# Patient Record
Sex: FEMALE | Race: WHITE | NOT HISPANIC OR LATINO | Employment: FULL TIME | URBAN - NONMETROPOLITAN AREA
[De-identification: names, ages, dates, MRNs, and addresses within clinical notes are randomized per-mention and may not be internally consistent; named-entity substitution may affect disease eponyms.]

---

## 2023-07-17 PROBLEM — Z98.890 S/P SPINAL SURGERY: Status: RESOLVED | Noted: 2023-07-17 | Resolved: 2023-07-17

## 2023-07-17 PROBLEM — M19.071 ARTHRITIS OF FOOT, RIGHT: Status: RESOLVED | Noted: 2023-07-17 | Resolved: 2023-07-17

## 2023-07-17 PROBLEM — F32.A ANXIETY AND DEPRESSION: Status: ACTIVE | Noted: 2023-07-17

## 2023-07-17 PROBLEM — I10 BENIGN HYPERTENSION: Status: ACTIVE | Noted: 2023-07-17

## 2023-07-17 PROBLEM — K63.5 COLON POLYP: Status: RESOLVED | Noted: 2023-07-17 | Resolved: 2023-07-17

## 2023-07-17 PROBLEM — M76.71 PERONEAL TENDINITIS OF RIGHT LOWER EXTREMITY: Status: RESOLVED | Noted: 2023-07-17 | Resolved: 2023-07-17

## 2023-07-17 PROBLEM — M43.10 ACQUIRED SPONDYLOLISTHESIS: Status: RESOLVED | Noted: 2023-07-17 | Resolved: 2023-07-17

## 2023-07-17 PROBLEM — F32.1 MODERATE MAJOR DEPRESSION (MULTI): Status: ACTIVE | Noted: 2023-07-17

## 2023-07-17 PROBLEM — M79.18 MYOFASCIAL PAIN: Status: ACTIVE | Noted: 2023-07-17

## 2023-07-17 PROBLEM — F41.9 ANXIETY AND DEPRESSION: Status: ACTIVE | Noted: 2023-07-17

## 2023-07-17 PROBLEM — M54.16 LUMBAR RADICULOPATHY: Status: ACTIVE | Noted: 2023-07-17

## 2023-07-17 PROBLEM — J30.9 ALLERGIC RHINITIS: Status: ACTIVE | Noted: 2023-07-17

## 2023-07-17 PROBLEM — K29.00 ACUTE GASTRITIS: Status: RESOLVED | Noted: 2023-07-17 | Resolved: 2023-07-17

## 2023-07-17 PROBLEM — R42 VERTIGO: Status: ACTIVE | Noted: 2023-07-17

## 2023-07-17 PROBLEM — M48.07 LUMBOSACRAL SPINAL STENOSIS: Status: ACTIVE | Noted: 2023-07-17

## 2023-07-17 PROBLEM — K58.0 IRRITABLE BOWEL SYNDROME WITH DIARRHEA: Status: RESOLVED | Noted: 2023-07-17 | Resolved: 2023-07-17

## 2023-07-17 PROBLEM — M85.80 OSTEOPENIA: Status: ACTIVE | Noted: 2023-07-17

## 2023-07-17 RX ORDER — FLUTICASONE PROPIONATE 50 MCG
SPRAY, SUSPENSION (ML) NASAL
COMMUNITY
Start: 2019-06-04

## 2023-07-17 RX ORDER — LIDOCAINE 50 MG/G
PATCH TOPICAL
COMMUNITY

## 2023-07-17 RX ORDER — VIT C/E/ZN/COPPR/LUTEIN/ZEAXAN 250MG-90MG
1 CAPSULE ORAL DAILY
COMMUNITY

## 2023-07-17 RX ORDER — LORATADINE 10 MG/1
TABLET ORAL
COMMUNITY
Start: 2019-06-04

## 2023-07-17 RX ORDER — ESTRADIOL 2 MG/1
1 TABLET ORAL DAILY
COMMUNITY

## 2023-07-18 ENCOUNTER — OFFICE VISIT (OUTPATIENT)
Dept: PRIMARY CARE | Facility: CLINIC | Age: 63
End: 2023-07-18
Payer: MEDICARE

## 2023-07-18 VITALS
OXYGEN SATURATION: 99 % | HEART RATE: 94 BPM | DIASTOLIC BLOOD PRESSURE: 88 MMHG | TEMPERATURE: 96 F | HEIGHT: 63 IN | BODY MASS INDEX: 24.38 KG/M2 | WEIGHT: 137.6 LBS | SYSTOLIC BLOOD PRESSURE: 126 MMHG

## 2023-07-18 DIAGNOSIS — F17.210 CIGARETTE NICOTINE DEPENDENCE, UNCOMPLICATED: ICD-10-CM

## 2023-07-18 DIAGNOSIS — F32.1 MODERATE MAJOR DEPRESSION (MULTI): ICD-10-CM

## 2023-07-18 DIAGNOSIS — I10 BENIGN HYPERTENSION: Primary | ICD-10-CM

## 2023-07-18 DIAGNOSIS — Z78.0 ASYMPTOMATIC POSTMENOPAUSAL STATE: ICD-10-CM

## 2023-07-18 DIAGNOSIS — E89.40 ASYMPTOMATIC POSTPROCEDURAL OVARIAN FAILURE: ICD-10-CM

## 2023-07-18 PROCEDURE — 1160F RVW MEDS BY RX/DR IN RCRD: CPT | Performed by: FAMILY MEDICINE

## 2023-07-18 PROCEDURE — 1036F TOBACCO NON-USER: CPT | Performed by: FAMILY MEDICINE

## 2023-07-18 PROCEDURE — 1159F MED LIST DOCD IN RCRD: CPT | Performed by: FAMILY MEDICINE

## 2023-07-18 PROCEDURE — 99213 OFFICE O/P EST LOW 20 MIN: CPT | Performed by: FAMILY MEDICINE

## 2023-07-18 PROCEDURE — 3079F DIAST BP 80-89 MM HG: CPT | Performed by: FAMILY MEDICINE

## 2023-07-18 PROCEDURE — 3008F BODY MASS INDEX DOCD: CPT | Performed by: FAMILY MEDICINE

## 2023-07-18 PROCEDURE — 3074F SYST BP LT 130 MM HG: CPT | Performed by: FAMILY MEDICINE

## 2023-07-18 ASSESSMENT — PATIENT HEALTH QUESTIONNAIRE - PHQ9
SUM OF ALL RESPONSES TO PHQ9 QUESTIONS 1 AND 2: 0
1. LITTLE INTEREST OR PLEASURE IN DOING THINGS: NOT AT ALL
2. FEELING DOWN, DEPRESSED OR HOPELESS: NOT AT ALL

## 2023-07-18 ASSESSMENT — ENCOUNTER SYMPTOMS
OCCASIONAL FEELINGS OF UNSTEADINESS: 0
DEPRESSION: 0
LOSS OF SENSATION IN FEET: 0

## 2023-07-18 NOTE — PROGRESS NOTES
"Subjective   Patient ID: Trisha Benitez is a 63 y.o. female who presents for Follow-up (No concerns having CT with contrast in a couple weeks).  HPI  Here for follow up  Has 20 pack year history of smoking due for her LDCT for follow up  HTN under control   Depression under control, has been stressed, mother was mauled by a pitbull and has lost the use of her right hand    Review of Systems  General: no fever  Eyes: no blurry vision  ENT: no sore throat, no ear pain  Resp: no cough, sob or wheezing  Cardio: no chest pain, no palpitations  Abd: no nausea/vomiting  : no dysuria, no increased urinary frequency      /88   Pulse 94   Temp 35.6 °C (96 °F)   Ht 1.588 m (5' 2.5\")   Wt 62.4 kg (137 lb 9.6 oz)   SpO2 99%   BMI 24.77 kg/m²       Objective   Physical Exam  Gen: NAD, alert  Head: normocephalic/atraumatic  Eyes: conjunctivae normal  Ears: canals clear bilaterally, TM normal   Nose: external nose normal   Resp: Clear to auscultation  CVS: Regular rate and rhythm  Abdomen: soft, NT, ND  Ext: no edema, NT of lower extremities  Neuro: gait normal     Assessment/Plan   Problem List Items Addressed This Visit       Benign hypertension - Primary    Moderate major depression (CMS/HCC)     Other Visit Diagnoses       Asymptomatic postmenopausal state        Relevant Orders    XR DEXA bone density    Cigarette nicotine dependence, uncomplicated        Relevant Orders    CT lung screening low dose    BMI 24.0-24.9, adult                   "

## 2023-07-18 NOTE — PATIENT INSTRUCTIONS
Please follow up in 6 months  Please take your medications as prescribed  Please get your CT chest and dexa scan done

## 2023-07-20 DIAGNOSIS — M25.552 LEFT HIP PAIN: Primary | ICD-10-CM

## 2023-07-28 DIAGNOSIS — E04.1 THYROID NODULE: Primary | ICD-10-CM

## 2024-01-18 ENCOUNTER — OFFICE VISIT (OUTPATIENT)
Dept: PRIMARY CARE | Facility: CLINIC | Age: 64
End: 2024-01-18
Payer: MEDICARE

## 2024-01-18 VITALS
WEIGHT: 139 LBS | SYSTOLIC BLOOD PRESSURE: 150 MMHG | DIASTOLIC BLOOD PRESSURE: 80 MMHG | TEMPERATURE: 97 F | OXYGEN SATURATION: 98 % | BODY MASS INDEX: 25.58 KG/M2 | HEIGHT: 62 IN | HEART RATE: 85 BPM

## 2024-01-18 DIAGNOSIS — Z12.11 SCREEN FOR COLON CANCER: ICD-10-CM

## 2024-01-18 DIAGNOSIS — F32.1 MODERATE MAJOR DEPRESSION (MULTI): ICD-10-CM

## 2024-01-18 DIAGNOSIS — Z00.00 MEDICARE ANNUAL WELLNESS VISIT, SUBSEQUENT: Primary | ICD-10-CM

## 2024-01-18 DIAGNOSIS — Z80.0 FAMILY HISTORY OF COLON CANCER: ICD-10-CM

## 2024-01-18 DIAGNOSIS — E78.5 HYPERLIPIDEMIA, UNSPECIFIED HYPERLIPIDEMIA TYPE: ICD-10-CM

## 2024-01-18 DIAGNOSIS — I10 BENIGN HYPERTENSION: ICD-10-CM

## 2024-01-18 DIAGNOSIS — T14.8XXA MUSCLE STRAIN: ICD-10-CM

## 2024-01-18 PROCEDURE — 1159F MED LIST DOCD IN RCRD: CPT | Performed by: FAMILY MEDICINE

## 2024-01-18 PROCEDURE — 1170F FXNL STATUS ASSESSED: CPT | Performed by: FAMILY MEDICINE

## 2024-01-18 PROCEDURE — 1160F RVW MEDS BY RX/DR IN RCRD: CPT | Performed by: FAMILY MEDICINE

## 2024-01-18 PROCEDURE — 3008F BODY MASS INDEX DOCD: CPT | Performed by: FAMILY MEDICINE

## 2024-01-18 PROCEDURE — 3079F DIAST BP 80-89 MM HG: CPT | Performed by: FAMILY MEDICINE

## 2024-01-18 PROCEDURE — G0439 PPPS, SUBSEQ VISIT: HCPCS | Performed by: FAMILY MEDICINE

## 2024-01-18 PROCEDURE — 1036F TOBACCO NON-USER: CPT | Performed by: FAMILY MEDICINE

## 2024-01-18 PROCEDURE — 3077F SYST BP >= 140 MM HG: CPT | Performed by: FAMILY MEDICINE

## 2024-01-18 RX ORDER — PREDNISONE 20 MG/1
TABLET ORAL
Qty: 18 TABLET | Refills: 0 | Status: SHIPPED | OUTPATIENT
Start: 2024-01-18

## 2024-01-18 ASSESSMENT — PATIENT HEALTH QUESTIONNAIRE - PHQ9
2. FEELING DOWN, DEPRESSED OR HOPELESS: NOT AT ALL
SUM OF ALL RESPONSES TO PHQ9 QUESTIONS 1 AND 2: 0
1. LITTLE INTEREST OR PLEASURE IN DOING THINGS: NOT AT ALL

## 2024-01-18 ASSESSMENT — ENCOUNTER SYMPTOMS
DEPRESSION: 0
OCCASIONAL FEELINGS OF UNSTEADINESS: 0
LOSS OF SENSATION IN FEET: 0

## 2024-01-18 ASSESSMENT — ACTIVITIES OF DAILY LIVING (ADL)
MANAGING_FINANCES: INDEPENDENT
BATHING: INDEPENDENT
TAKING_MEDICATION: INDEPENDENT
DOING_HOUSEWORK: INDEPENDENT
GROCERY_SHOPPING: INDEPENDENT
DRESSING: INDEPENDENT

## 2024-01-18 NOTE — PROGRESS NOTES
"Subjective   Reason for Visit: Trisha Benitez is an 63 y.o. female here for a Medicare Wellness visit.     Past Medical, Surgical, and Family History reviewed and updated in chart.    Reviewed all medications by prescribing practitioner or clinical pharmacist (such as prescriptions, OTCs, herbal therapies and supplements) and documented in the medical record.    HPI  Here for medicare annual physical  Due for her repeat colonoscopy, has FH of colon cancer  BP elevated today, usually better, will come back next week for bp check  Right forearm has been hurting for the past week and a half, denies any swelling, no trauma. No numbness/tingling. Has not put anything on it. Did clean the attic recently.   Depression under control     Patient Care Team:  Rosalina Begum MD as PCP - General  Rosalina Begum MD as PCP - [employer] PCP     Review of Systems  General: no fever  Eyes: no blurry vision  ENT: no sore throat, no ear pain  Resp: no cough, sob or wheezing  Cardio: no chest pain, no palpitations  Abd: no nausea/vomiting  : no dysuria, no increased urinary frequency    Objective   Vitals:  /80   Pulse 85   Temp 36.1 °C (97 °F)   Ht 1.575 m (5' 2\")   Wt 63 kg (139 lb)   SpO2 98%   BMI 25.42 kg/m²       Physical Exam  Gen: NAD, alert  Head: normocephalic/atraumatic  Eyes: conjunctivae normal  Ears: canals clear bilaterally, TM normal   Nose: external nose normal   Oropharynx: clear   Resp: Clear to auscultation  CVS: Regular rate and rhythm  Abdomen: soft, NT, ND  Ext: no edema, NT of lower extremities, no swelling, redness or lesions on right forearm  Neuro: gait normal     Assessment/Plan   Problem List Items Addressed This Visit       Benign hypertension  Continue current regimen     Moderate major depression (CMS/HCC)  stable     Other Visit Diagnoses       Medicare annual wellness visit, subsequent    -  Primary    BMI 25.0-25.9,adult        Muscle strain        " Relevant Medications    predniSONE (Deltasone) 20 mg tablet  Advised to try biofreeze to the area as well    Screen for colon cancer        Relevant Orders    Colonoscopy Screening; High Risk Patient    Family history of colon cancer        Relevant Orders    Colonoscopy Screening; High Risk Patient    Hyperlipidemia, unspecified hyperlipidemia type        Relevant Orders    Lipid Panel    TSH with reflex to Free T4 if abnormal    CBC    Comprehensive Metabolic Panel

## 2024-02-05 ENCOUNTER — LAB (OUTPATIENT)
Dept: LAB | Facility: LAB | Age: 64
End: 2024-02-05
Payer: MEDICARE

## 2024-02-05 ENCOUNTER — TELEPHONE (OUTPATIENT)
Dept: PRIMARY CARE | Facility: CLINIC | Age: 64
End: 2024-02-05

## 2024-02-05 DIAGNOSIS — I10 BENIGN HYPERTENSION: Primary | ICD-10-CM

## 2024-02-05 DIAGNOSIS — E78.5 HYPERLIPIDEMIA, UNSPECIFIED HYPERLIPIDEMIA TYPE: ICD-10-CM

## 2024-02-05 LAB
ALBUMIN SERPL BCP-MCNC: 4.2 G/DL (ref 3.4–5)
ALP SERPL-CCNC: 61 U/L (ref 33–136)
ALT SERPL W P-5'-P-CCNC: 16 U/L (ref 7–45)
ANION GAP SERPL CALC-SCNC: 11 MMOL/L (ref 10–20)
AST SERPL W P-5'-P-CCNC: 16 U/L (ref 9–39)
BILIRUB SERPL-MCNC: 0.5 MG/DL (ref 0–1.2)
BUN SERPL-MCNC: 19 MG/DL (ref 6–23)
CALCIUM SERPL-MCNC: 9.3 MG/DL (ref 8.6–10.3)
CHLORIDE SERPL-SCNC: 103 MMOL/L (ref 98–107)
CHOLEST SERPL-MCNC: 202 MG/DL (ref 0–199)
CHOLESTEROL/HDL RATIO: 1.8
CO2 SERPL-SCNC: 30 MMOL/L (ref 21–32)
CREAT SERPL-MCNC: 1.06 MG/DL (ref 0.5–1.05)
EGFRCR SERPLBLD CKD-EPI 2021: 58 ML/MIN/1.73M*2
ERYTHROCYTE [DISTWIDTH] IN BLOOD BY AUTOMATED COUNT: 12.3 % (ref 11.5–14.5)
GLUCOSE SERPL-MCNC: 89 MG/DL (ref 74–99)
HCT VFR BLD AUTO: 42.2 % (ref 36–46)
HDLC SERPL-MCNC: 112.3 MG/DL
HGB BLD-MCNC: 13.7 G/DL (ref 12–16)
LDLC SERPL CALC-MCNC: 81 MG/DL
MCH RBC QN AUTO: 31.1 PG (ref 26–34)
MCHC RBC AUTO-ENTMCNC: 32.5 G/DL (ref 32–36)
MCV RBC AUTO: 96 FL (ref 80–100)
NON HDL CHOLESTEROL: 90 MG/DL (ref 0–149)
NRBC BLD-RTO: 0 /100 WBCS (ref 0–0)
PLATELET # BLD AUTO: 268 X10*3/UL (ref 150–450)
POTASSIUM SERPL-SCNC: 3.8 MMOL/L (ref 3.5–5.3)
PROT SERPL-MCNC: 7.1 G/DL (ref 6.4–8.2)
RBC # BLD AUTO: 4.4 X10*6/UL (ref 4–5.2)
SODIUM SERPL-SCNC: 140 MMOL/L (ref 136–145)
TRIGL SERPL-MCNC: 45 MG/DL (ref 0–149)
TSH SERPL-ACNC: 1.58 MIU/L (ref 0.44–3.98)
VLDL: 9 MG/DL (ref 0–40)
WBC # BLD AUTO: 4.9 X10*3/UL (ref 4.4–11.3)

## 2024-02-05 PROCEDURE — 36415 COLL VENOUS BLD VENIPUNCTURE: CPT

## 2024-02-05 NOTE — TELEPHONE ENCOUNTER
Will you put in a referral for Dr. Oliveros for a wellness eye exam, I can print it and the patient will  at her bp check tomorrow.

## 2024-02-06 ENCOUNTER — CLINICAL SUPPORT (OUTPATIENT)
Dept: PRIMARY CARE | Facility: CLINIC | Age: 64
End: 2024-02-06
Payer: MEDICARE

## 2024-02-06 VITALS — DIASTOLIC BLOOD PRESSURE: 84 MMHG | SYSTOLIC BLOOD PRESSURE: 132 MMHG

## 2024-04-02 PROCEDURE — 88175 CYTOPATH C/V AUTO FLUID REDO: CPT

## 2024-04-02 PROCEDURE — 87624 HPV HI-RISK TYP POOLED RSLT: CPT

## 2024-04-02 PROCEDURE — 88141 CYTOPATH C/V INTERPRET: CPT | Performed by: STUDENT IN AN ORGANIZED HEALTH CARE EDUCATION/TRAINING PROGRAM

## 2024-04-04 ENCOUNTER — LAB REQUISITION (OUTPATIENT)
Dept: LAB | Facility: HOSPITAL | Age: 64
End: 2024-04-04
Payer: MEDICARE

## 2024-04-04 DIAGNOSIS — Z11.51 ENCOUNTER FOR SCREENING FOR HUMAN PAPILLOMAVIRUS (HPV): ICD-10-CM

## 2024-04-04 DIAGNOSIS — Z01.419 ENCOUNTER FOR GYNECOLOGICAL EXAMINATION (GENERAL) (ROUTINE) WITHOUT ABNORMAL FINDINGS: ICD-10-CM

## 2024-07-16 ENCOUNTER — APPOINTMENT (OUTPATIENT)
Dept: PRIMARY CARE | Facility: CLINIC | Age: 64
End: 2024-07-16
Payer: MEDICARE

## 2024-07-16 VITALS
HEART RATE: 89 BPM | DIASTOLIC BLOOD PRESSURE: 85 MMHG | SYSTOLIC BLOOD PRESSURE: 129 MMHG | TEMPERATURE: 97 F | OXYGEN SATURATION: 96 % | BODY MASS INDEX: 24.88 KG/M2 | HEIGHT: 62 IN | WEIGHT: 135.2 LBS

## 2024-07-16 DIAGNOSIS — N18.31 STAGE 3A CHRONIC KIDNEY DISEASE (MULTI): ICD-10-CM

## 2024-07-16 DIAGNOSIS — J30.9 ALLERGIC RHINITIS, UNSPECIFIED SEASONALITY, UNSPECIFIED TRIGGER: ICD-10-CM

## 2024-07-16 DIAGNOSIS — R10.9 RIGHT SIDED ABDOMINAL PAIN: ICD-10-CM

## 2024-07-16 DIAGNOSIS — F17.210 CIGARETTE NICOTINE DEPENDENCE, UNCOMPLICATED: ICD-10-CM

## 2024-07-16 DIAGNOSIS — E04.2 MULTIPLE THYROID NODULES: Primary | ICD-10-CM

## 2024-07-16 DIAGNOSIS — Z12.31 ENCOUNTER FOR SCREENING MAMMOGRAM FOR MALIGNANT NEOPLASM OF BREAST: ICD-10-CM

## 2024-07-16 PROBLEM — N18.30 STAGE 3 CHRONIC KIDNEY DISEASE, UNSPECIFIED WHETHER STAGE 3A OR 3B CKD (MULTI): Status: ACTIVE | Noted: 2024-07-16

## 2024-07-16 LAB
POC APPEARANCE, URINE: CLEAR
POC BILIRUBIN, URINE: NEGATIVE
POC BLOOD, URINE: NEGATIVE
POC COLOR, URINE: YELLOW
POC GLUCOSE, URINE: NEGATIVE MG/DL
POC KETONES, URINE: NEGATIVE MG/DL
POC LEUKOCYTES, URINE: NEGATIVE
POC NITRITE,URINE: NEGATIVE
POC PH, URINE: 7 PH
POC PROTEIN, URINE: NEGATIVE MG/DL
POC SPECIFIC GRAVITY, URINE: 1.02
POC UROBILINOGEN, URINE: 1 EU/DL

## 2024-07-16 PROCEDURE — 3079F DIAST BP 80-89 MM HG: CPT | Performed by: FAMILY MEDICINE

## 2024-07-16 PROCEDURE — 3008F BODY MASS INDEX DOCD: CPT | Performed by: FAMILY MEDICINE

## 2024-07-16 PROCEDURE — 1123F ACP DISCUSS/DSCN MKR DOCD: CPT | Performed by: FAMILY MEDICINE

## 2024-07-16 PROCEDURE — 81003 URINALYSIS AUTO W/O SCOPE: CPT | Performed by: FAMILY MEDICINE

## 2024-07-16 PROCEDURE — 1159F MED LIST DOCD IN RCRD: CPT | Performed by: FAMILY MEDICINE

## 2024-07-16 PROCEDURE — 99214 OFFICE O/P EST MOD 30 MIN: CPT | Performed by: FAMILY MEDICINE

## 2024-07-16 PROCEDURE — 3074F SYST BP LT 130 MM HG: CPT | Performed by: FAMILY MEDICINE

## 2024-07-16 PROCEDURE — 1157F ADVNC CARE PLAN IN RCRD: CPT | Performed by: FAMILY MEDICINE

## 2024-07-16 PROCEDURE — 1158F ADVNC CARE PLAN TLK DOCD: CPT | Performed by: FAMILY MEDICINE

## 2024-07-16 RX ORDER — LORATADINE 10 MG/1
10 TABLET ORAL DAILY
Qty: 90 TABLET | Refills: 3 | Status: SHIPPED | OUTPATIENT
Start: 2024-07-16

## 2024-07-16 RX ORDER — LORATADINE 10 MG/1
TABLET ORAL
Status: CANCELLED | OUTPATIENT
Start: 2024-07-16

## 2024-07-16 ASSESSMENT — ENCOUNTER SYMPTOMS
LOSS OF SENSATION IN FEET: 0
OCCASIONAL FEELINGS OF UNSTEADINESS: 0
DEPRESSION: 0

## 2024-07-16 NOTE — PROGRESS NOTES
"Subjective   Patient ID: Trisha Benitez is a 64 y.o. female who presents for Follow-up (Feels like she pulled a muscle on right side on stomach going on for a couple weeks).    HPI  Here for follow up  Has not yet scheduled colonoscopy, has FH of colon cancer  Has been having right flank pain x couple of weeks. Does wear a back brace not sure if that has been putting pressure on her stomach. Pain with movement. No fever. No nausea/vomiting. No bloating. No dysuria or increased urinary frequency. No constipation or diarrhea, but has been having increased gas. No trauma.  Due for her annual follow up of thyroid nodules next month  Needs refill of claritin  Has CKD, stable  Due for her annual LDCT for 20 pack year smoking history    Review of Systems  General: no fever  Eyes: no blurry vision  ENT: no sore throat, no ear pain  Resp: no cough, sob or wheezing  Cardio: no chest pain, no palpitations  Abd: see HPI  : no dysuria, no increased urinary frequency      /85   Pulse 89   Temp 36.1 °C (97 °F)   Ht 1.575 m (5' 2\")   Wt 61.3 kg (135 lb 3.2 oz)   SpO2 96%   BMI 24.73 kg/m²       Objective   Physical Exam  Gen: NAD, alert  Head: normocephalic/atraumatic  Eyes: conjunctivae normal  Ears: canals clear bilaterally, TM normal   Nose: external nose normal   Oropharynx: clear   Resp: Clear to auscultation  CVS: Regular rate and rhythm  Abdomen: soft, tenderness in the right upper quadrant, no rebound or guarding  Ext: no edema, NT of lower extremities  Neuro: gait normal     Assessment/Plan   Problem List Items Addressed This Visit       Allergic rhinitis    Relevant Medications    loratadine (Claritin) 10 mg tablet    Stage 3 chronic kidney disease, unspecified whether stage 3a or 3b CKD (Multi)  stable     Other Visit Diagnoses       Multiple thyroid nodules    -  Primary    Relevant Orders    US thyroid    Cigarette smoker        Relevant Orders    CT lung screening low dose    Encounter for screening " mammogram for malignant neoplasm of breast        Relevant Orders    BI mammo bilateral screening tomosynthesis    Right sided abdominal pain        Relevant Orders    CT abdomen pelvis wo IV contrast    POCT UA Automated manually resulted (Completed)

## 2024-08-29 ENCOUNTER — HOSPITAL ENCOUNTER (OUTPATIENT)
Dept: RADIOLOGY | Facility: HOSPITAL | Age: 64
Discharge: HOME | End: 2024-08-29
Payer: MEDICARE

## 2024-08-29 DIAGNOSIS — R10.9 RIGHT SIDED ABDOMINAL PAIN: ICD-10-CM

## 2024-08-29 DIAGNOSIS — F17.210 CIGARETTE NICOTINE DEPENDENCE, UNCOMPLICATED: ICD-10-CM

## 2024-08-29 PROCEDURE — 71271 CT THORAX LUNG CANCER SCR C-: CPT

## 2024-08-29 PROCEDURE — 74176 CT ABD & PELVIS W/O CONTRAST: CPT

## 2024-08-30 ENCOUNTER — HOSPITAL ENCOUNTER (OUTPATIENT)
Dept: RADIOLOGY | Facility: HOSPITAL | Age: 64
Discharge: HOME | End: 2024-08-30
Payer: MEDICARE

## 2024-08-30 ENCOUNTER — APPOINTMENT (OUTPATIENT)
Dept: RADIOLOGY | Facility: HOSPITAL | Age: 64
End: 2024-08-30
Payer: MEDICARE

## 2024-08-30 DIAGNOSIS — E04.2 MULTIPLE THYROID NODULES: ICD-10-CM

## 2024-08-30 PROCEDURE — 76536 US EXAM OF HEAD AND NECK: CPT

## 2024-09-04 ENCOUNTER — APPOINTMENT (OUTPATIENT)
Dept: RADIOLOGY | Facility: HOSPITAL | Age: 64
End: 2024-09-04
Payer: MEDICARE

## 2024-09-04 VITALS — WEIGHT: 134 LBS | HEIGHT: 61 IN | BODY MASS INDEX: 25.3 KG/M2

## 2024-09-04 DIAGNOSIS — Z12.31 ENCOUNTER FOR SCREENING MAMMOGRAM FOR MALIGNANT NEOPLASM OF BREAST: ICD-10-CM

## 2024-09-04 PROCEDURE — 77067 SCR MAMMO BI INCL CAD: CPT

## 2024-09-04 PROCEDURE — 77067 SCR MAMMO BI INCL CAD: CPT | Performed by: RADIOLOGY

## 2024-09-04 PROCEDURE — 77063 BREAST TOMOSYNTHESIS BI: CPT | Performed by: RADIOLOGY

## 2025-01-16 ENCOUNTER — APPOINTMENT (OUTPATIENT)
Dept: PRIMARY CARE | Facility: CLINIC | Age: 65
End: 2025-01-16
Payer: MEDICARE

## 2025-04-16 PROCEDURE — 88175 CYTOPATH C/V AUTO FLUID REDO: CPT

## 2025-04-18 ENCOUNTER — LAB REQUISITION (OUTPATIENT)
Dept: LAB | Facility: HOSPITAL | Age: 65
End: 2025-04-18
Payer: MEDICARE

## 2025-04-18 DIAGNOSIS — Z11.51 ENCOUNTER FOR SCREENING FOR HUMAN PAPILLOMAVIRUS (HPV): ICD-10-CM

## 2025-04-18 DIAGNOSIS — Z01.419 ENCOUNTER FOR GYNECOLOGICAL EXAMINATION (GENERAL) (ROUTINE) WITHOUT ABNORMAL FINDINGS: ICD-10-CM

## 2025-04-24 ENCOUNTER — TELEPHONE (OUTPATIENT)
Dept: PRIMARY CARE | Facility: CLINIC | Age: 65
End: 2025-04-24
Payer: MEDICARE

## 2025-05-02 LAB
CYTOLOGY CMNT CVX/VAG CYTO-IMP: NORMAL
LAB AP HPV GENOTYPE QUESTION: YES
LAB AP HPV HR: NORMAL
LABORATORY COMMENT REPORT: NORMAL
MENSTRUAL HX REPORTED: NORMAL
PATH REPORT.TOTAL CANCER: NORMAL

## 2025-06-12 ENCOUNTER — APPOINTMENT (OUTPATIENT)
Dept: PRIMARY CARE | Facility: CLINIC | Age: 65
End: 2025-06-12
Payer: MEDICARE

## 2025-06-12 VITALS
SYSTOLIC BLOOD PRESSURE: 136 MMHG | TEMPERATURE: 97.1 F | BODY MASS INDEX: 25.54 KG/M2 | HEIGHT: 62 IN | HEART RATE: 83 BPM | DIASTOLIC BLOOD PRESSURE: 84 MMHG | WEIGHT: 138.8 LBS | OXYGEN SATURATION: 98 %

## 2025-06-12 DIAGNOSIS — E78.2 MIXED HYPERLIPIDEMIA: ICD-10-CM

## 2025-06-12 DIAGNOSIS — Z00.00 MEDICARE ANNUAL WELLNESS VISIT, SUBSEQUENT: Primary | ICD-10-CM

## 2025-06-12 DIAGNOSIS — Z78.0 ASYMPTOMATIC POSTMENOPAUSAL STATE: ICD-10-CM

## 2025-06-12 DIAGNOSIS — E04.1 THYROID NODULE: ICD-10-CM

## 2025-06-12 DIAGNOSIS — Z12.31 ENCOUNTER FOR SCREENING MAMMOGRAM FOR MALIGNANT NEOPLASM OF BREAST: ICD-10-CM

## 2025-06-12 DIAGNOSIS — Z80.0 FAMILY HISTORY OF COLON CANCER: ICD-10-CM

## 2025-06-12 DIAGNOSIS — Z12.11 SCREENING FOR COLON CANCER: ICD-10-CM

## 2025-06-12 DIAGNOSIS — N18.31 STAGE 3A CHRONIC KIDNEY DISEASE (MULTI): ICD-10-CM

## 2025-06-12 DIAGNOSIS — F32.1 MODERATE MAJOR DEPRESSION (MULTI): ICD-10-CM

## 2025-06-12 DIAGNOSIS — F17.210 CIGARETTE NICOTINE DEPENDENCE WITHOUT COMPLICATION: ICD-10-CM

## 2025-06-12 PROCEDURE — 3075F SYST BP GE 130 - 139MM HG: CPT | Performed by: FAMILY MEDICINE

## 2025-06-12 PROCEDURE — 3079F DIAST BP 80-89 MM HG: CPT | Performed by: FAMILY MEDICINE

## 2025-06-12 PROCEDURE — G0439 PPPS, SUBSEQ VISIT: HCPCS | Performed by: FAMILY MEDICINE

## 2025-06-12 PROCEDURE — 1157F ADVNC CARE PLAN IN RCRD: CPT | Performed by: FAMILY MEDICINE

## 2025-06-12 PROCEDURE — 3008F BODY MASS INDEX DOCD: CPT | Performed by: FAMILY MEDICINE

## 2025-06-12 PROCEDURE — 1170F FXNL STATUS ASSESSED: CPT | Performed by: FAMILY MEDICINE

## 2025-06-12 PROCEDURE — 1159F MED LIST DOCD IN RCRD: CPT | Performed by: FAMILY MEDICINE

## 2025-06-12 PROCEDURE — 1160F RVW MEDS BY RX/DR IN RCRD: CPT | Performed by: FAMILY MEDICINE

## 2025-06-12 ASSESSMENT — ENCOUNTER SYMPTOMS
OCCASIONAL FEELINGS OF UNSTEADINESS: 0
LOSS OF SENSATION IN FEET: 0
DEPRESSION: 0

## 2025-06-12 ASSESSMENT — ACTIVITIES OF DAILY LIVING (ADL)
BATHING: INDEPENDENT
MANAGING_FINANCES: INDEPENDENT
GROCERY_SHOPPING: INDEPENDENT
DOING_HOUSEWORK: INDEPENDENT
TAKING_MEDICATION: INDEPENDENT
DRESSING: INDEPENDENT

## 2025-06-12 NOTE — PROGRESS NOTES
"Subjective   Reason for Visit: Trisha Benitez is an 64 y.o. female here for a Medicare Wellness visit.     Past Medical, Surgical, and Family History reviewed and updated in chart.    Reviewed all medications by prescribing practitioner or clinical pharmacist (such as prescriptions, OTCs, herbal therapies and supplements) and documented in the medical record.    Chief Complaint   Patient presents with    Medicare Annual Wellness Visit Subsequent       HPI  Here for medicare annual visit  Depression under control, does not feel like she needs to restart on medications at this time. Dealing with loss of her good friend, daily.   Has CKD, stable  Has FH of colon cancer, has not yet gotten her colonoscopy done  Due for annual follow up of thyroid nodules    Patient Care Team:  Rosalina Begum MD as PCP - General  Rosalina Begum MD as PCP - [employer] PCP     Review of Systems  General: no fever  Eyes: no blurry vision  ENT: no sore throat, no ear pain  Resp: no cough, sob or wheezing  Cardio: no chest pain, no palpitations  Abd: no nausea/vomiting  : no dysuria, no increased urinary frequency    Objective   Vitals:  /84   Pulse 83   Temp 36.2 °C (97.1 °F)   Ht 1.565 m (5' 1.61\")   Wt 63 kg (138 lb 12.8 oz)   SpO2 98%   BMI 25.71 kg/m²       Physical Exam  Gen: NAD, alert  Head: normocephalic/atraumatic  Eyes: conjunctivae normal  Ears: canals clear bilaterally, TM normal   Nose: external nose normal   Oropharynx: clear   Resp: Clear to auscultation  CVS: Regular rate and rhythm  Abdomen: soft, NT, ND  Ext: no edema, NT of lower extremities  Neuro: gait normal     Assessment & Plan  Cigarette nicotine dependence without complication    Orders:    CT lung screening low dose; Future    Medicare annual wellness visit, subsequent    Orders:    Lipid Panel; Future    Comprehensive Metabolic Panel; Future    Asymptomatic postmenopausal state    Orders:    XR DEXA bone density; " Future    Encounter for screening mammogram for malignant neoplasm of breast    Orders:    BI mammo bilateral screening tomosynthesis; Future    Family history of colon cancer    Orders:    Colonoscopy Screening; High Risk Patient; Future    Screening for colon cancer    Orders:    Colonoscopy Screening; High Risk Patient; Future    Thyroid nodule    Orders:    US thyroid; Future    Mixed hyperlipidemia    Orders:    Lipid Panel; Future    CBC; Future    Comprehensive Metabolic Panel; Future    TSH with reflex to Free T4 if abnormal; Future    Stage 3a chronic kidney disease (Multi)  CMP       Moderate major depression (Multi)  Under control        BMI 25.0-25.9,adult

## 2025-06-27 ENCOUNTER — TELEMEDICINE (OUTPATIENT)
Dept: PRIMARY CARE | Facility: CLINIC | Age: 65
End: 2025-06-27
Payer: MEDICARE

## 2025-06-27 ENCOUNTER — TELEPHONE (OUTPATIENT)
Dept: PRIMARY CARE | Facility: CLINIC | Age: 65
End: 2025-06-27
Payer: MEDICARE

## 2025-06-27 DIAGNOSIS — R19.7 DIARRHEA OF PRESUMED INFECTIOUS ORIGIN: Primary | ICD-10-CM

## 2025-06-27 PROCEDURE — 99213 OFFICE O/P EST LOW 20 MIN: CPT | Performed by: FAMILY MEDICINE

## 2025-06-27 NOTE — TELEPHONE ENCOUNTER
Rody Rico     Has diarrhea starting Monday.  They have since become a bright yellow color.  She has been eating toast or rice 1-2 times per day.  It happen about 3 hours after eating.  Just today it started to get some more solid bits.      She is wondering if there are labs she can get done for this.

## 2025-06-27 NOTE — TELEPHONE ENCOUNTER
Patient is only having pain during bowel movements along with occasional hot flash.  Denies abdominal pain (see previous exception), gas, bloating; painful, burning or frequent urination.

## 2025-06-27 NOTE — PROGRESS NOTES
Subjective   Patient ID: Trisha Benitez is a 64 y.o. female who presents for diarrhea    HPI  Virtual Visit    An interactive audio and video telecommunication system which permits real time communications between the patient (at the originating site) and provider (at the distant site) was utilized to provide this telehealth service.   Verbal consent was requested and obtained from Trisha Benitez on this date, 06/27/25 for a telehealth visit and the patient's location was confirmed at the time of the visit.     Had headache 5 days ago,  then next day having diarrhea since then.. Watery. Every time she eats anything has diarrhea. No fever. No nausea/vomiting. No abdominal pain, gets cramping before she has to have diarrhea.    Review of Systems  As per HPI    Vitals:   due to telehealth visit, vitals not obtained, patient did not have them available at the time of the visit       Objective   Physical Exam  Physical exam done virtually through the computer screen     Gen: NAD  eyes: conjunctivae normal   Nose: external nose normal   Resp: does not appear to be short of breath at present time, no audible wheezing    Assessment/Plan   Problem List Items Addressed This Visit    None  Visit Diagnoses         Diarrhea of presumed infectious origin    -  Primary    Relevant Orders    Magnesium    C. difficile, PCR    Stool Pathogen Panel, PCR

## 2025-06-28 LAB
ALBUMIN SERPL-MCNC: 4.6 G/DL (ref 3.6–5.1)
ALP SERPL-CCNC: 77 U/L (ref 37–153)
ALT SERPL-CCNC: 20 U/L (ref 6–29)
ANION GAP SERPL CALCULATED.4IONS-SCNC: 13 MMOL/L (CALC) (ref 7–17)
AST SERPL-CCNC: 20 U/L (ref 10–35)
BILIRUB SERPL-MCNC: 0.6 MG/DL (ref 0.2–1.2)
BUN SERPL-MCNC: 18 MG/DL (ref 7–25)
C COLI+JEJUNI+LARI FUSA STL QL NAA+PROBE: NOT DETECTED
CALCIUM SERPL-MCNC: 9.8 MG/DL (ref 8.6–10.4)
CHLORIDE SERPL-SCNC: 101 MMOL/L (ref 98–110)
CHOLEST SERPL-MCNC: 233 MG/DL
CHOLEST/HDLC SERPL: 2.6 (CALC)
CO2 SERPL-SCNC: 24 MMOL/L (ref 20–32)
CREAT SERPL-MCNC: 1.12 MG/DL (ref 0.5–1.05)
EC STX1 GENE STL QL NAA+PROBE: NOT DETECTED
EC STX2 GENE STL QL NAA+PROBE: NOT DETECTED
EGFRCR SERPLBLD CKD-EPI 2021: 54 ML/MIN/1.73M2
ERYTHROCYTE [DISTWIDTH] IN BLOOD BY AUTOMATED COUNT: 12.4 % (ref 11–15)
GLUCOSE SERPL-MCNC: 102 MG/DL (ref 65–99)
HCT VFR BLD AUTO: 48.1 % (ref 35–45)
HDLC SERPL-MCNC: 91 MG/DL
HGB BLD-MCNC: 15.9 G/DL (ref 11.7–15.5)
LDLC SERPL CALC-MCNC: 122 MG/DL (CALC)
MAGNESIUM SERPL-MCNC: 1.9 MG/DL (ref 1.5–2.5)
MCH RBC QN AUTO: 31.6 PG (ref 27–33)
MCHC RBC AUTO-ENTMCNC: 33.1 G/DL (ref 32–36)
MCV RBC AUTO: 95.6 FL (ref 80–100)
NONHDLC SERPL-MCNC: 142 MG/DL (CALC)
NOROV GI+II ORF1-ORF2 JNC STL QL NAA+PR: NOT DETECTED
PLATELET # BLD AUTO: 269 THOUSAND/UL (ref 140–400)
PMV BLD REES-ECKER: 9.2 FL (ref 7.5–12.5)
POTASSIUM SERPL-SCNC: 3.9 MMOL/L (ref 3.5–5.3)
PROT SERPL-MCNC: 7.4 G/DL (ref 6.1–8.1)
RBC # BLD AUTO: 5.03 MILLION/UL (ref 3.8–5.1)
RVA NSP5 STL QL NAA+PROBE: NOT DETECTED
SALMONELLA SP RPOD STL QL NAA+PROBE: NOT DETECTED
SHIGELLA DNA SPEC QL NAA+PROBE: NOT DETECTED
SODIUM SERPL-SCNC: 138 MMOL/L (ref 135–146)
TRIGL SERPL-MCNC: 101 MG/DL
TSH SERPL-ACNC: 1.65 MIU/L (ref 0.4–4.5)
V CHOL+PARA RFBL+TRKH+TNAA STL QL NAA+PR: NOT DETECTED
WBC # BLD AUTO: 5.6 THOUSAND/UL (ref 3.8–10.8)
Y ENTERO RECN STL QL NAA+PROBE: NOT DETECTED

## 2025-06-30 LAB — C DIFF TOX GENS STL QL NAA+PROBE: NOT DETECTED

## 2025-07-30 ENCOUNTER — TELEPHONE (OUTPATIENT)
Dept: RADIOLOGY | Facility: HOSPITAL | Age: 65
End: 2025-07-30
Payer: MEDICARE

## 2025-07-30 NOTE — TELEPHONE ENCOUNTER
VM message left requesting patient to call and schedule the follow up Lung Cancer Screening exam. Requested they schedule with radiology @ [phone].Encouraged  to return my call @  [phone] for any additional assistance.

## 2025-08-07 ENCOUNTER — TELEPHONE (OUTPATIENT)
Dept: RADIOLOGY | Facility: HOSPITAL | Age: 65
End: 2025-08-07
Payer: MEDICARE

## 2025-08-07 NOTE — TELEPHONE ENCOUNTER
VM message left requesting patient to call and schedule the follow up Lung Cancer Screening exam. Requested they schedule with radiology @ [phone].Encouraged  to return my call @  [phone] for any additional assistance.

## 2025-09-02 ENCOUNTER — HOSPITAL ENCOUNTER (OUTPATIENT)
Dept: RADIOLOGY | Facility: HOSPITAL | Age: 65
Discharge: HOME | End: 2025-09-02
Payer: MEDICARE

## 2025-09-02 DIAGNOSIS — Z78.0 ASYMPTOMATIC POSTMENOPAUSAL STATE: ICD-10-CM

## 2025-09-02 DIAGNOSIS — F17.210 CIGARETTE NICOTINE DEPENDENCE WITHOUT COMPLICATION: ICD-10-CM

## 2025-09-02 DIAGNOSIS — E04.1 THYROID NODULE: ICD-10-CM

## 2025-09-02 PROCEDURE — 76536 US EXAM OF HEAD AND NECK: CPT

## 2025-09-02 PROCEDURE — 77080 DXA BONE DENSITY AXIAL: CPT

## 2025-09-02 PROCEDURE — 76536 US EXAM OF HEAD AND NECK: CPT | Performed by: RADIOLOGY

## 2025-09-02 PROCEDURE — 71271 CT THORAX LUNG CANCER SCR C-: CPT

## 2025-09-02 PROCEDURE — 77080 DXA BONE DENSITY AXIAL: CPT | Performed by: RADIOLOGY

## 2025-09-02 PROCEDURE — 71271 CT THORAX LUNG CANCER SCR C-: CPT | Performed by: RADIOLOGY
